# Patient Record
Sex: FEMALE | Race: WHITE | NOT HISPANIC OR LATINO
[De-identification: names, ages, dates, MRNs, and addresses within clinical notes are randomized per-mention and may not be internally consistent; named-entity substitution may affect disease eponyms.]

---

## 2017-07-24 PROBLEM — Z00.00 ENCOUNTER FOR PREVENTIVE HEALTH EXAMINATION: Status: ACTIVE | Noted: 2017-07-24

## 2021-01-12 ENCOUNTER — TRANSCRIPTION ENCOUNTER (OUTPATIENT)
Age: 41
End: 2021-01-12

## 2021-03-04 ENCOUNTER — TRANSCRIPTION ENCOUNTER (OUTPATIENT)
Age: 41
End: 2021-03-04

## 2021-10-25 ENCOUNTER — APPOINTMENT (OUTPATIENT)
Dept: RHEUMATOLOGY | Facility: CLINIC | Age: 41
End: 2021-10-25

## 2021-11-08 ENCOUNTER — APPOINTMENT (OUTPATIENT)
Dept: RHEUMATOLOGY | Facility: CLINIC | Age: 41
End: 2021-11-08

## 2022-01-11 ENCOUNTER — APPOINTMENT (OUTPATIENT)
Dept: RHEUMATOLOGY | Facility: CLINIC | Age: 42
End: 2022-01-11
Payer: MEDICAID

## 2022-01-11 VITALS
TEMPERATURE: 97.3 F | HEIGHT: 60 IN | HEART RATE: 107 BPM | DIASTOLIC BLOOD PRESSURE: 86 MMHG | BODY MASS INDEX: 33.38 KG/M2 | SYSTOLIC BLOOD PRESSURE: 150 MMHG | WEIGHT: 170 LBS | OXYGEN SATURATION: 95 %

## 2022-01-11 PROCEDURE — 99204 OFFICE O/P NEW MOD 45 MIN: CPT

## 2022-01-11 NOTE — HISTORY OF PRESENT ILLNESS
[FreeTextEntry1] : 42 y/o female w/ hypothyroidism, chronic neck/back pain on morphine and oxycodone referred to rheumatology for joint pains and swelling.\par In 6/2021, patient started to have swelling of L ankle improved in 3 days. Pt then started to have swelling of b/l ankles with grouped pustular rash of chest and back. Patient was prescribed steroid creams with improvement in the rash. Workup showed high inflammatory markers. Patient started to have swelling of hands, improved with diuretics. Pt was seen by vascular, endocrinology, cardiology. Seen Dr. Benito and prescribed HCQ 200mg BID, but due to itchiness, takes it once daily.\par Reports hair loss, fatigue. Occasional eczema patches.\par Aunt has RA, Crohn's disease.\par

## 2022-01-11 NOTE — ASSESSMENT
[FreeTextEntry1] : 40 y/o female w/ hypothyroidism, chronic neck/back pain on morphine and oxycodone referred to rheumatology for joint pains and swelling.\par In 6/2021, patient started to have swelling of L ankle improved in 3 days. Pt then started to have swelling of b/l ankles with grouped pustular rash of chest and back. Patient was prescribed steroid creams with improvement in the rash. Workup showed high inflammatory markers. Patient started to have swelling of hands, improved with diuretics. Pt was seen by vascular, endocrinology, cardiology. Seen Dr. Benito and prescribed HCQ 200mg BID, but due to itchiness, takes it once daily.\par Reports hair loss, fatigue. Occasional eczema patches.\par Aunt has RA, Crohn's disease.\par \par Patient has significant pitting edema of b/l ankles in pictures and reported edema of b/l hands currently with associated pain (although swelling component is much more significant). Based on pt's history, patient has high inflammatory markers but negative autoimmune rheum workup with normal b/l ankle XRs.\par \par Patient's description and picture of swelling is more likely from water retention rather than joint synovitis. Patient's rash of chest does not appear classic for any specific autoimmune disease. Elevated inflammatory markers can be from autoimmune inflammatory disease, infection, malignancy, etc. and is not specific.\par Patient has already had significant rheum workup with Dr. Benito and I would like to see those results prior to obtaining further bloodwork.\par \par - Pt to send me results of Dr. Benito's workup. Depending on findings, will order any further workup that will be sent to patient.\par - c/w HCQ 200mg daily for now. Patient has been on for ~2 month. If any effect, should have full effect around 3 months.\par - Obtain XR of b/l hands and wrists. XR of b/l ankles reportedly normal.\par - RTC 1-1.5 months after any further workup results.\par \par \par

## 2022-02-22 ENCOUNTER — APPOINTMENT (OUTPATIENT)
Dept: RHEUMATOLOGY | Facility: CLINIC | Age: 42
End: 2022-02-22
Payer: MEDICAID

## 2022-02-22 VITALS
WEIGHT: 160 LBS | HEART RATE: 112 BPM | DIASTOLIC BLOOD PRESSURE: 81 MMHG | SYSTOLIC BLOOD PRESSURE: 139 MMHG | OXYGEN SATURATION: 95 % | TEMPERATURE: 97.6 F | HEIGHT: 60 IN | BODY MASS INDEX: 31.41 KG/M2

## 2022-02-22 PROCEDURE — 99214 OFFICE O/P EST MOD 30 MIN: CPT

## 2022-02-22 NOTE — ASSESSMENT
[FreeTextEntry1] : 40 y/o female w/ hypothyroidism, chronic neck/back pain on morphine and oxycodone presents as follow up for joint pains and swelling.\par Of note pt had COVID-19 infection few months prior to onset of symptoms. In 6/2021, patient started to have swelling of L ankle improved in 3 days. Pt then started to have swelling of b/l ankles with grouped pustular rash of chest and back. Patient was prescribed steroid creams with improvement in the rash. Workup showed high inflammatory markers. Patient started to have swelling of hands, improved with diuretics. Pt was seen by vascular, endocrinology, cardiology. Seen Dr. Benito and prescribed HCQ 200mg BID, but due to itchiness, takes it once daily. Pt eventually stopped the medication due to pruritus.\par Reports hair loss, fatigue. Occasional eczema patches. \par Aunt has RA, Crohn's disease. \par \par Workup showed improvement in her CRP, otherwise negative for signs of underlying autoimmune rheum markers, infections, malignancy. XR b/l hands/wrists normal. It is unclear if HCQ helped since pt was only on it sporatically due to pruritis. At this time, I would like to watch pt's inflammatory markers and symptoms to see how they evolve without any treatment prior to moving on to more toxic medication such as methotrexate.\par \par - Obtain repeat inflammatory markers\par - Obtain US of b/l hand to evaluate for signs of synovitis.\par - Failed HCQ due to pruritus. Will decide on any further treatment such as MTX based on pt's symptoms, inflammatory markers, and US results.\par - RTC 2 months for follow up\par

## 2022-02-22 NOTE — HISTORY OF PRESENT ILLNESS
[FreeTextEntry1] : HISTORY: \par 42 y/o female w/ hypothyroidism, chronic neck/back pain on morphine and oxycodone presents as follow up for joint pains and swelling. \par In 6/2021, patient started to have swelling of L ankle improved in 3 days. Pt then started to have swelling of b/l ankles with grouped pustular rash of chest and back. Patient was prescribed steroid creams with improvement in the rash. Workup showed high inflammatory markers. Patient started to have swelling of hands, improved with diuretics. Pt was seen by vascular, endocrinology, cardiology. Seen Dr. Benito and prescribed HCQ 200mg BID, but due to itchiness, takes it once daily. \par Reports hair loss, fatigue. Occasional eczema patches. \par Aunt has RA, Crohn's disease. \par \par INTERVAL HISTORY: \par Patient had to stop HCQ because it caused diffuse pruritus. Pt tried to stop and restart medication multiple times but continues to have the side effects. Pt does not think that she was on the medication long enough to feel improvement. Pt reports that her pain and swelling has moved generally from b/l ankles to b/l hands.\par Denies any other symptoms\par \par WORKUP: \par Remarkable for (8/2021 - 2/2022): uric acid 8.4, CRP 60.6 -> 17.7 (nml <= 8.0) \par Normal/neg (8/2021 - 2/2022): CBC, CMP (except ALT 30), ESR, NASIR, dsDNA, Smith, RNP, SSA, SSB, TPO Ab, TG Ab, Ana-1, SCl-70, centromere Ab, cardiolipin Abs, LAC, RF, CCP, ANCAs, cryoglobulins, HLA-B27, ACE level, CPK, aldolase, SPEP, HAYDEN, Kappa/Lambda ratio, LDH, TSH, thyroid panel, Vit D 25-OH, HgbA1C, ferritin, iron panel, G6PD level, Heavy metal screening, Tick-borne diseases, Hep B/C panel, quantiferon TB \par XR b/l hands/wrists (2/2022): Normal \par

## 2022-02-22 NOTE — HISTORY OF PRESENT ILLNESS
[FreeTextEntry1] : HISTORY: \par 40 y/o female w/ hypothyroidism, chronic neck/back pain on morphine and oxycodone presents as follow up for joint pains and swelling. \par In 6/2021, patient started to have swelling of L ankle improved in 3 days. Pt then started to have swelling of b/l ankles with grouped pustular rash of chest and back. Patient was prescribed steroid creams with improvement in the rash. Workup showed high inflammatory markers. Patient started to have swelling of hands, improved with diuretics. Pt was seen by vascular, endocrinology, cardiology. Seen Dr. Benito and prescribed HCQ 200mg BID, but due to itchiness, takes it once daily. \par Reports hair loss, fatigue. Occasional eczema patches. \par Aunt has RA, Crohn's disease. \par \par INTERVAL HISTORY: \par Patient had to stop HCQ because it caused diffuse pruritus. Pt tried to stop and restart medication multiple times but continues to have the side effects. Pt does not think that she was on the medication long enough to feel improvement. Pt reports that her pain and swelling has moved generally from b/l ankles to b/l hands.\par Denies any other symptoms\par \par WORKUP: \par Remarkable for (8/2021 - 2/2022): uric acid 8.4, CRP 60.6 -> 17.7 (nml <= 8.0) \par Normal/neg (8/2021 - 2/2022): CBC, CMP (except ALT 30), ESR, NASIR, dsDNA, Smith, RNP, SSA, SSB, TPO Ab, TG Ab, Ana-1, SCl-70, centromere Ab, cardiolipin Abs, LAC, RF, CCP, ANCAs, cryoglobulins, HLA-B27, ACE level, CPK, aldolase, SPEP, HAYDEN, Kappa/Lambda ratio, LDH, TSH, thyroid panel, Vit D 25-OH, HgbA1C, ferritin, iron panel, G6PD level, Heavy metal screening, Tick-borne diseases, Hep B/C panel, quantiferon TB \par XR b/l hands/wrists (2/2022): Normal \par

## 2022-03-17 ENCOUNTER — NON-APPOINTMENT (OUTPATIENT)
Age: 42
End: 2022-03-17

## 2022-03-22 ENCOUNTER — APPOINTMENT (OUTPATIENT)
Dept: ULTRASOUND IMAGING | Facility: CLINIC | Age: 42
End: 2022-03-22
Payer: MEDICAID

## 2022-03-22 ENCOUNTER — OUTPATIENT (OUTPATIENT)
Dept: OUTPATIENT SERVICES | Facility: HOSPITAL | Age: 42
LOS: 1 days | End: 2022-03-22
Payer: MEDICAID

## 2022-03-22 DIAGNOSIS — Z90.49 ACQUIRED ABSENCE OF OTHER SPECIFIED PARTS OF DIGESTIVE TRACT: Chronic | ICD-10-CM

## 2022-03-22 DIAGNOSIS — R21 RASH AND OTHER NONSPECIFIC SKIN ERUPTION: ICD-10-CM

## 2022-03-22 PROCEDURE — 76881 US COMPL JOINT R-T W/IMG: CPT | Mod: 26,LT

## 2022-03-22 PROCEDURE — 76881 US COMPL JOINT R-T W/IMG: CPT

## 2022-04-26 ENCOUNTER — APPOINTMENT (OUTPATIENT)
Dept: RHEUMATOLOGY | Facility: CLINIC | Age: 42
End: 2022-04-26
Payer: MEDICAID

## 2022-04-26 VITALS
BODY MASS INDEX: 32.39 KG/M2 | SYSTOLIC BLOOD PRESSURE: 143 MMHG | HEIGHT: 60 IN | DIASTOLIC BLOOD PRESSURE: 83 MMHG | OXYGEN SATURATION: 96 % | TEMPERATURE: 97.8 F | WEIGHT: 165 LBS | HEART RATE: 126 BPM

## 2022-04-26 DIAGNOSIS — M48.00 SPINAL STENOSIS, SITE UNSPECIFIED: ICD-10-CM

## 2022-04-26 PROCEDURE — 99213 OFFICE O/P EST LOW 20 MIN: CPT

## 2022-04-26 NOTE — ASSESSMENT
[FreeTextEntry1] : 40 y/o female w/ hypothyroidism, chronic neck/back pain on morphine and oxycodone presents as follow up for joint pains and swelling. \par Of note pt had COVID-19 infection few months prior to onset of symptoms. In 6/2021, patient started to have swelling of L ankle improved in 3 days. Pt then started to have swelling of b/l ankles with grouped pustular rash of chest and back. Patient was prescribed steroid creams with improvement in the rash. Workup showed high inflammatory markers. Patient started to have swelling of hands, improved with diuretics. Pt was seen by vascular, endocrinology, cardiology. Seen Dr. Benito and prescribed HCQ 200mg BID, but due to itchiness, takes it once daily. Pt eventually stopped the medication due to pruritus. \par Reports hair loss, fatigue. Occasional eczema patches.  \par Aunt has RA, Crohn's disease.  \par \par Workup showed improvement in her CRP. Negative for signs of underlying autoimmune rheum markers, infections, malignancy. XR b/l hands/wrists normal. US of b/l hands showed trace b/l radiocarpal joint effusions without hyperemic synovial flow. Findings suggestive of mild OA of 2nd, 4th, 5th PIPs and 5th DIP.\par Pt had MR of C-spine and L-spine done showing spinal stenosis and nerve compression.\par \par At this time, there is no compelling evidence that patient has underlying autoimmune disease and I do believe that the degree of abnormalities on spinal imaging explains the patient's symptoms. I do not believe at this time that the benefit of starting any toxic medications like methotrexate in this patient without clear evidence of underlying autoimmune disease is greater than the risk of these medications.\par \par - Will await pt's labwork done yesterday (inflammatory markers) and will call pt with results\par - Continue follow up with neurology and pain management for treatment of pt's neuropathic pains, injections/procedures for compressive neuropathy. Discussed that if conservative therapies fail, pt can be considered for surgery with orthopedics or neurosurgery.\par - RTC 3 months or earlier PRN\par

## 2022-04-26 NOTE — HISTORY OF PRESENT ILLNESS
[FreeTextEntry1] : HISTORY: \par 42 y/o female w/ hypothyroidism, chronic neck/back pain on morphine and oxycodone presents as follow up for joint pains and swelling. \par Of note pt had COVID-19 infection few months prior to onset of symptoms. In 6/2021, patient started to have swelling of L ankle improved in 3 days. Pt then started to have swelling of b/l ankles with grouped pustular rash of chest and back. Patient was prescribed steroid creams with improvement in the rash. Workup showed high inflammatory markers. Patient started to have swelling of hands, improved with diuretics. Pt was seen by vascular, endocrinology, cardiology. Seen Dr. Benito and prescribed HCQ 200mg BID, but due to itchiness, takes it once daily. Pt eventually stopped the medication due to pruritus. \par Reports hair loss, fatigue. Occasional eczema patches.  \par Aunt has RA, Crohn's disease.  \par \par Workup showed improvement in her CRP. Negative for signs of underlying autoimmune rheum markers, infections, malignancy. XR b/l hands/wrists normal. It is unclear if HCQ helped since pt was only on it sporatically due to pruritus. At this time, I would like to watch pt's inflammatory markers and symptoms to see how they evolve without any treatment prior to moving on to more toxic medication such as methotrexate. \par \par INTERVAL HISTORY: \par Since last visit, pt was seen by pain management but states that she had a terrible experience - she was told that her opioid medication will definitely not be filled and she felt that her symptoms were dismissed. Pt is currently trying to be set up with a different pain management doctor.\par Pt had MR of C-spine and L-spine done showing spinal stenosis and nerve compression. Pt continuing to have pain and swelling of b/l UE and LE\par \par WORKUP: \par CRP 60.6 (8/2021) -> 17.7 (2/2022) -> 18.7 (3/2022) \par Remarkable for (8/2021 - 2/2022): uric acid 8.4 \par Normal/neg (8/2021 - 2/2022): CBC, CMP (except ALT 30), ESR, NASIR, dsDNA, Smith, RNP, SSA, SSB, TPO Ab, TG Ab, Ana-1, SCl-70, centromere Ab, cardiolipin Abs, LAC, RF, CCP, ANCAs, cryoglobulins, HLA-B27, ACE level, CPK, aldolase, SPEP, HAYDEN, Kappa/Lambda ratio, LDH, TSH, thyroid panel, Vit D 25-OH, HgbA1C, ferritin, iron panel, G6PD level, Heavy metal screening, Tick-borne diseases, Hep B/C panel, quantiferon TB  \par XR b/l hands/wrists (2/2022): Normal \par US b/l hands (3/2022): Trace b/l radiocarpal joint effusions without hyperemic synovial flow. Findings suggestive of mild OA of 2nd, 4th, 5th PIPs and 5th DIP.\par

## 2022-05-16 ENCOUNTER — APPOINTMENT (OUTPATIENT)
Dept: NEUROLOGY | Facility: CLINIC | Age: 42
End: 2022-05-16
Payer: MEDICAID

## 2022-05-16 ENCOUNTER — NON-APPOINTMENT (OUTPATIENT)
Age: 42
End: 2022-05-16

## 2022-05-16 VITALS
BODY MASS INDEX: 31.41 KG/M2 | WEIGHT: 160 LBS | SYSTOLIC BLOOD PRESSURE: 140 MMHG | DIASTOLIC BLOOD PRESSURE: 78 MMHG | HEIGHT: 60 IN

## 2022-05-16 PROCEDURE — 99204 OFFICE O/P NEW MOD 45 MIN: CPT

## 2022-05-16 RX ORDER — OXYCODONE AND ACETAMINOPHEN 10; 325 MG/1; MG/1
10-325 TABLET ORAL
Qty: 150 | Refills: 0 | Status: ACTIVE | COMMUNITY
Start: 2022-05-01

## 2022-05-16 RX ORDER — ERGOCALCIFEROL 1.25 MG/1
1.25 MG CAPSULE, LIQUID FILLED ORAL
Qty: 4 | Refills: 0 | Status: ACTIVE | COMMUNITY
Start: 2022-04-30

## 2022-05-16 RX ORDER — MORPHINE SULFATE 60 MG/1
60 TABLET, FILM COATED, EXTENDED RELEASE ORAL
Qty: 60 | Refills: 0 | Status: ACTIVE | COMMUNITY
Start: 2022-05-01

## 2022-05-16 RX ORDER — CLONAZEPAM 0.5 MG/1
0.5 TABLET ORAL
Qty: 60 | Refills: 0 | Status: ACTIVE | COMMUNITY
Start: 2022-04-26

## 2022-05-16 NOTE — PHYSICAL EXAM
[General Appearance - Alert] : alert [General Appearance - In No Acute Distress] : in no acute distress [General Appearance - Well-Appearing] : healthy appearing [Oriented To Time, Place, And Person] : oriented to person, place, and time [Impaired Insight] : insight and judgment were intact [Affect] : the affect was normal [Memory Recent] : recent memory was not impaired [Person] : oriented to person [Place] : oriented to place [Time] : oriented to time [Concentration Intact] : normal concentrating ability [Fluency] : fluency intact [Comprehension] : comprehension intact [Cranial Nerves Optic (II)] : visual acuity intact bilaterally,  visual fields full to confrontation, pupils equal round and reactive to light [Cranial Nerves Oculomotor (III)] : extraocular motion intact [Cranial Nerves Trigeminal (V)] : facial sensation intact symmetrically [Cranial Nerves Facial (VII)] : face symmetrical [Cranial Nerves Vestibulocochlear (VIII)] : hearing was intact bilaterally [Cranial Nerves Glossopharyngeal (IX)] : tongue and palate midline [Cranial Nerves Accessory (XI - Cranial And Spinal)] : head turning and shoulder shrug symmetric [Cranial Nerves Hypoglossal (XII)] : there was no tongue deviation with protrusion [Motor Tone] : muscle tone was normal in all four extremities [Motor Strength] : muscle strength was normal in all four extremities [No Muscle Atrophy] : normal bulk in all four extremities [Paresis Pronator Drift Right-Sided] : no pronator drift on the right [Sensation Tactile Decrease] : light touch was intact [Sensation Pain / Temperature Decrease] : pain and temperature was intact [Romberg's Sign] : Romberg's sign was negtive [Abnormal Walk] : normal gait [Balance] : balance was intact [Past-pointing] : there was no past-pointing [Tremor] : no tremor present [Coordination - Dysmetria Impaired Finger-to-Nose Bilateral] : not present [Coordination - Dysmetria Impaired Heel-to-Shin Bilateral] : not present [2+] : Patella left 2+ [1+] : Ankle jerk left 1+ [Plantar Reflex Right Only] : normal on the right [Plantar Reflex Left Only] : normal on the left [PERRL With Normal Accommodation] : pupils were equal in size, round, reactive to light, with normal accommodation [Extraocular Movements] : extraocular movements were intact [Full Visual Field] : full visual field

## 2022-05-16 NOTE — ASSESSMENT
[FreeTextEntry1] : Trigeminal neuralgia by history\par \par Excellent Pain control on Trileptal 600 mg at bedtime.\par \par Brain lipoma as per patient's history\par \par Follow-up brain MRI will be arranged.  \par \par Routine follow-up here in 6 months and by phone prior to that as needed.

## 2022-05-16 NOTE — HISTORY OF PRESENT ILLNESS
[FreeTextEntry1] : She has been following with a neurologist, Dr. Morrell who has moved out of [facility].  Diagnosed with trigeminal neuralgia in 2004.  Her description encompasses a right V 3 distribution.  She was getting shooting electric like pains.  She is on Trileptal 600 m g at bedtime with excellent control of her pain.  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  She says her last MRI was 3 to 4 years ago.\par \par She is also under care of pain management for neck and back pains.  Currently on morphine and oxycodone for that.  She uses clonazepam as needed for sleep.\par \par She is followed by rheumatology for joint pains and swelling of unclear etiology

## 2022-06-12 ENCOUNTER — NON-APPOINTMENT (OUTPATIENT)
Age: 42
End: 2022-06-12

## 2022-07-10 ENCOUNTER — NON-APPOINTMENT (OUTPATIENT)
Age: 42
End: 2022-07-10

## 2022-07-19 ENCOUNTER — APPOINTMENT (OUTPATIENT)
Dept: RHEUMATOLOGY | Facility: CLINIC | Age: 42
End: 2022-07-19

## 2022-07-19 VITALS
DIASTOLIC BLOOD PRESSURE: 74 MMHG | RESPIRATION RATE: 18 BRPM | WEIGHT: 170 LBS | HEIGHT: 60 IN | OXYGEN SATURATION: 97 % | BODY MASS INDEX: 33.38 KG/M2 | SYSTOLIC BLOOD PRESSURE: 135 MMHG | HEART RATE: 95 BPM

## 2022-07-19 DIAGNOSIS — R21 RASH AND OTHER NONSPECIFIC SKIN ERUPTION: ICD-10-CM

## 2022-07-19 PROCEDURE — 99214 OFFICE O/P EST MOD 30 MIN: CPT

## 2022-07-19 NOTE — ASSESSMENT
[FreeTextEntry1] : 42 y/o female w/ hypothyroidism, chronic neck/back pain on morphine and oxycodone presents as follow up for joint pains and swelling. \par Of note pt had COVID-19 infection few months prior to onset of symptoms. In 6/2021, patient started to have swelling of L ankle improved in 3 days. Pt then started to have swelling of b/l ankles with grouped pustular rash of chest and back. Patient was prescribed steroid creams with improvement in the rash. Workup showed high inflammatory markers. Patient started to have swelling of hands, improved with diuretics. Pt was seen by vascular, endocrinology, cardiology. Seen Dr. Benito and prescribed HCQ 200mg BID, but due to itchiness, takes it once daily. Pt eventually stopped the medication due to pruritus. \par Reports hair loss, fatigue. Occasional eczema patches. \par Aunt has RA, Crohn's disease. \par \par Workup shows contiuning improvement in her CRP. Negative for signs of underlying autoimmune rheum markers, infections, malignancy. XR b/l hands/wrists normal. US of b/l hands showed trace b/l radiocarpal joint effusions without hyperemic synovial flow. Findings suggestive of mild OA of 2nd, 4th, 5th PIPs and 5th DIP. \par Pt had MR of C-spine and L-spine done showing spinal stenosis and nerve compression. \par MR T-spine showed incidental esophageal signal and pt underwent EGD, colonoscopy with biopsy of the abnormal tissue, pending results.\par Pt has been following with ENT for feeling of ear pressure, told that she has pressure in the ears likely caused by inflammation in the ears.\par \par At this time, there is no compelling evidence that patient has underlying autoimmune disease and I do believe that the degree of abnormalities on spinal imaging explains the patient's symptoms. I do not believe at this time that the benefit of starting any toxic medications like methotrexate in this patient without clear evidence of underlying autoimmune disease is greater than the risk of these medications.\par \par Pt continues to be understandably frustrated by lack of diagnosis. I explained that my workup is specifically to evaluate for signs of autoimmune rheum diseases and there is so evidence of them so far.\par \par - Obtain repeat inflammatory markers \par - Obtain MR of L ankle to distinguish between water retention and synovitis.\par - Continue follow up with neurology and pain management for treatment of pt's neuropathic pains, injections/procedures for compressive neuropathy. Discussed that if conservative therapies fail, pt can be considered for surgery with orthopedics or neurosurgery. \par - Will call patient with results if all received. RTC 1 month for follow up \par

## 2022-07-19 NOTE — ASSESSMENT
[FreeTextEntry1] : 40 y/o female w/ hypothyroidism, chronic neck/back pain on morphine and oxycodone presents as follow up for joint pains and swelling. \par Of note pt had COVID-19 infection few months prior to onset of symptoms. In 6/2021, patient started to have swelling of L ankle improved in 3 days. Pt then started to have swelling of b/l ankles with grouped pustular rash of chest and back. Patient was prescribed steroid creams with improvement in the rash. Workup showed high inflammatory markers. Patient started to have swelling of hands, improved with diuretics. Pt was seen by vascular, endocrinology, cardiology. Seen Dr. Benito and prescribed HCQ 200mg BID, but due to itchiness, takes it once daily. Pt eventually stopped the medication due to pruritus. \par Reports hair loss, fatigue. Occasional eczema patches. \par Aunt has RA, Crohn's disease. \par \par Workup shows contiuning improvement in her CRP. Negative for signs of underlying autoimmune rheum markers, infections, malignancy. XR b/l hands/wrists normal. US of b/l hands showed trace b/l radiocarpal joint effusions without hyperemic synovial flow. Findings suggestive of mild OA of 2nd, 4th, 5th PIPs and 5th DIP. \par Pt had MR of C-spine and L-spine done showing spinal stenosis and nerve compression. \par MR T-spine showed incidental esophageal signal and pt underwent EGD, colonoscopy with biopsy of the abnormal tissue, pending results.\par Pt has been following with ENT for feeling of ear pressure, told that she has pressure in the ears likely caused by inflammation in the ears.\par \par At this time, there is no compelling evidence that patient has underlying autoimmune disease and I do believe that the degree of abnormalities on spinal imaging explains the patient's symptoms. I do not believe at this time that the benefit of starting any toxic medications like methotrexate in this patient without clear evidence of underlying autoimmune disease is greater than the risk of these medications.\par \par Pt continues to be understandably frustrated by lack of diagnosis. I explained that my workup is specifically to evaluate for signs of autoimmune rheum diseases and there is so evidence of them so far.\par \par - Obtain repeat inflammatory markers \par - Obtain MR of L ankle to distinguish between water retention and synovitis.\par - Continue follow up with neurology and pain management for treatment of pt's neuropathic pains, injections/procedures for compressive neuropathy. Discussed that if conservative therapies fail, pt can be considered for surgery with orthopedics or neurosurgery. \par - Will call patient with results if all received. RTC 1 month for follow up \par

## 2022-07-19 NOTE — HISTORY OF PRESENT ILLNESS
[FreeTextEntry1] : HISTORY: \par 40 y/o female w/ hypothyroidism, chronic neck/back pain on morphine and oxycodone presents as follow up for joint pains and swelling. \par Of note pt had COVID-19 infection few months prior to onset of symptoms. In 6/2021, patient started to have swelling of L ankle improved in 3 days. Pt then started to have swelling of b/l ankles with grouped pustular rash of chest and back. Patient was prescribed steroid creams with improvement in the rash. Workup showed high inflammatory markers. Patient started to have swelling of hands, improved with diuretics. Pt was seen by vascular, endocrinology, cardiology. Seen Dr. Benito and prescribed HCQ 200mg BID, but due to itchiness, takes it once daily. Pt eventually stopped the medication due to pruritus. \par Reports hair loss, fatigue. Occasional eczema patches. \par Aunt has RA, Crohn's disease. \par \par Workup shows contiuning improvement in her CRP. Negative for signs of underlying autoimmune rheum markers, infections, malignancy. XR b/l hands/wrists normal. US of b/l hands showed trace b/l radiocarpal joint effusions without hyperemic synovial flow. Findings suggestive of mild OA of 2nd, 4th, 5th PIPs and 5th DIP. \par Pt had MR of C-spine and L-spine done showing spinal stenosis and nerve compression. \par \par INTERVAL HISTORY: \par MR T-spine showed incidental esophageal signal and pt underwent EGD, colonoscopy with biopsy of the abnormal tissue, pending results.\par Pt has been following with ENT for feeling of ear pressure, told that she has pressure in the ears likely caused by inflammation in the ears. Pt reports continuing recurrent swelling of hands and feet which improves with water pills. Pt continues to have joint pains in b/l hands, wrists, and feet that is not associated with time of day or activity.\par \par WORKUP: \par CRP 60.6 (8/2021) -> 17.7 (2/2022) -> 18.7 (3/2022) -> 13.4 (4/2022) \par Remarkable for (8/2021 - 2/2022): uric acid 8.4 \par Normal/neg (8/2021 - 2/2022): CBC, CMP (except ALT 30), ESR, NASIR, dsDNA, Smith, RNP, SSA, SSB, TPO Ab, TG Ab, Ana-1, SCl-70, centromere Ab, cardiolipin Abs, LAC, RF, CCP, ANCAs, cryoglobulins, HLA-B27, ACE level, CPK, aldolase, SPEP, HAYDEN, Kappa/Lambda ratio, LDH, TSH, thyroid panel, Vit D 25-OH, HgbA1C, ferritin, iron panel, G6PD level, Heavy metal screening, Tick-borne diseases, Hep B/C panel, quantiferon TB \par XR b/l hands/wrists (2/2022): Normal \par US b/l hands (3/2022): Trace b/l radiocarpal joint effusions without hyperemic synovial flow. Findings suggestive of mild OA of 2nd, 4th, 5th PIPs and 5th DIP.\par \par

## 2022-08-03 ENCOUNTER — NON-APPOINTMENT (OUTPATIENT)
Age: 42
End: 2022-08-03

## 2022-08-23 ENCOUNTER — APPOINTMENT (OUTPATIENT)
Dept: RHEUMATOLOGY | Facility: CLINIC | Age: 42
End: 2022-08-23

## 2022-08-23 VITALS
TEMPERATURE: 98.2 F | WEIGHT: 170 LBS | OXYGEN SATURATION: 98 % | HEIGHT: 60 IN | BODY MASS INDEX: 33.38 KG/M2 | HEART RATE: 100 BPM | SYSTOLIC BLOOD PRESSURE: 142 MMHG | DIASTOLIC BLOOD PRESSURE: 84 MMHG

## 2022-08-23 DIAGNOSIS — M25.471 EFFUSION, RIGHT ANKLE: ICD-10-CM

## 2022-08-23 DIAGNOSIS — M25.50 PAIN IN UNSPECIFIED JOINT: ICD-10-CM

## 2022-08-23 DIAGNOSIS — M79.89 OTHER SPECIFIED SOFT TISSUE DISORDERS: ICD-10-CM

## 2022-08-23 DIAGNOSIS — M25.472 EFFUSION, RIGHT ANKLE: ICD-10-CM

## 2022-08-23 PROCEDURE — 99213 OFFICE O/P EST LOW 20 MIN: CPT

## 2022-08-23 NOTE — ASSESSMENT
[FreeTextEntry1] : 42 y/o female w/ hypothyroidism, chronic neck/back pain on morphine and oxycodone presents as follow up for joint pains and swelling.  \par Of note pt had COVID-19 infection few months prior to onset of symptoms. In 6/2021, patient started to have swelling of L ankle improved in 3 days. Pt then started to have swelling of b/l ankles with grouped pustular rash of chest and back. Patient was prescribed steroid creams with improvement in the rash. Workup showed high inflammatory markers. Patient started to have swelling of hands, improved with diuretics. Pt was seen by vascular, endocrinology, cardiology. Seen Dr. Benito and prescribed HCQ 200mg BID, but due to itchiness, takes it once daily. Pt eventually stopped the medication due to pruritus.  \par Reports hair loss, fatigue. Occasional eczema patches.  \par Aunt has RA, Crohn's disease.  \par \par Workup shows continuing improvement in her CRP. Negative for signs of underlying autoimmune rheum markers, infections, malignancy. XR b/l hands/wrists normal. US of b/l hands showed trace b/l radiocarpal joint effusions without hyperemic synovial flow. Findings suggestive of mild OA of 2nd, 4th, 5th PIPs and 5th DIP.  \par Pt had MR of C-spine and L-spine done showing spinal stenosis and nerve compression.  \par MR T-spine showed incidental esophageal signal and pt underwent EGD, colonoscopy with biopsy of the abnormal tissue, pending results. \par Pt has been following with ENT for feeling of ear pressure, told that she has pressure in the ears likely caused by inflammation in the ears. \par \par At this time, there is no compelling evidence that patient has underlying autoimmune disease and I do believe that the degree of abnormalities on spinal imaging explains the patient's symptoms. I do not believe at this time that the benefit of starting any toxic medications like methotrexate in this patient without clear evidence of underlying autoimmune disease is greater than the risk of these medications.\par Pt has had workup for b/l LE edema by cardiology which was negative.\par \par Pt continues to be understandably frustrated by lack of diagnosis. I explained that my workup is specifically to evaluate for signs of autoimmune rheum diseases and there is so evidence of them so far. \par \par - Obtain 24 hour urine protein to evaluate for possible proteinuria causing water retention.\par - Pending results of MR of L ankle to distinguish between water retention and synovitis. \par - Pending 13.4.4 eta protein result from [facility]\par - Continue follow up with neurology and pain management for treatment of pt's neuropathic pains, injections/procedures for compressive neuropathy. Discussed that if conservative therapies fail, pt can be considered for surgery with orthopedics or neurosurgery.  \par - Will call pt with results. If MR shows synovitis, will discuss steroid and DMARDs. If proteinuria, will refer to nephro. If neither, will consider discussing malignancy screening (including general malignancy screening, pelvic ultrasound, potentially CT C/A/P).\par

## 2022-08-23 NOTE — HISTORY OF PRESENT ILLNESS
[FreeTextEntry1] : HISTORY: \par 42 y/o female w/ hypothyroidism, chronic neck/back pain on morphine and oxycodone presents as follow up for joint pains and swelling.  \par Of note pt had COVID-19 infection few months prior to onset of symptoms. In 6/2021, patient started to have swelling of L ankle improved in 3 days. Pt then started to have swelling of b/l ankles with grouped pustular rash of chest and back. Patient was prescribed steroid creams with improvement in the rash. Workup showed high inflammatory markers. Patient started to have swelling of hands, improved with diuretics. Pt was seen by vascular, endocrinology, cardiology. Seen Dr. Benito and prescribed HCQ 200mg BID, but due to itchiness, takes it once daily. Pt eventually stopped the medication due to pruritus.  \par Reports hair loss, fatigue. Occasional eczema patches.  \par Aunt has RA, Crohn's disease.  \par \par Workup shows continuing improvement in her CRP. Negative for signs of underlying autoimmune rheum markers, infections, malignancy. XR b/l hands/wrists normal. US of b/l hands showed trace b/l radiocarpal joint effusions without hyperemic synovial flow. Findings suggestive of mild OA of 2nd, 4th, 5th PIPs and 5th DIP.  \par Pt had MR of C-spine and L-spine done showing spinal stenosis and nerve compression.  \par MR T-spine showed incidental esophageal signal and pt underwent EGD, colonoscopy with biopsy of the abnormal tissue, pending results. \par Pt has been following with ENT for feeling of ear pressure, told that she has pressure in the ears likely caused by inflammation in the ears. \par \par At this time, there is no compelling evidence that patient has underlying autoimmune disease and I do believe that the degree of abnormalities on spinal imaging explains the patient's symptoms. I do not believe at this time that the benefit of starting any toxic medications like methotrexate in this patient without clear evidence of underlying autoimmune disease is greater than the risk of these medications. \par \par Pt continues to be understandably frustrated by lack of diagnosis. I explained that my workup is specifically to evaluate for signs of autoimmune rheum diseases and there is so evidence of them so far. \par \par INTERVAL HISTORY: \par Pt had repeat labwork done showing CRP persistently elevated but normal ESR. Pt has high WBC count, which was not previously seen. Pt denies any symptoms of infection including fever, dysuria, SOB, cough, and denies any use of steroids. Pt has not had MR ankle done yet.\par Pt continues to have similar symptoms of recurrent b/l ankle swelling, b/l hand stiffness unchanged.\par \par WORKUP:  \par CRP 60.6 (8/2021) -> 17.7 (2/2022) -> 18.7 (3/2022) -> 13.4 (4/2022) -> 21.9 (8/2022)\par Remarkable for (8/2021 - 2/2022): uric acid 8.4  \par Normal/neg (8/2021 - 2/2022): CBC, CMP (except ALT 30), ESR, NASIR, dsDNA, Smith, RNP, SSA, SSB, TPO Ab, TG Ab, Ana-1, SCl-70, centromere Ab, cardiolipin Abs, LAC, RF, CCP, ANCAs, cryoglobulins, HLA-B27, ACE level, CPK, aldolase, SPEP, HAYDEN, Kappa/Lambda ratio, LDH, TSH, thyroid panel, Vit D 25-OH, HgbA1C, ferritin, iron panel, G6PD level, Heavy metal screening, Tick-borne diseases, Hep B/C panel, quantiferon TB  \par XR b/l hands/wrists (2/2022): Normal  \par MR T-spine (6/2022): No spinal canal or foraminal stenosis. Minimal leftward curvature. Mild multilevel end plate spondylosis. Indeterminate abnormal signal of anterior aspect of distal thoracic esophagus – recommend GI consult, esophagram, postcontrast chest CT. \par US b/l hands (3/2022): Trace b/l radiocarpal joint effusions without hyperemic synovial flow. Findings suggestive of mild OA of 2nd, 4th, 5th PIPs and 5th DIP.\par

## 2022-09-08 ENCOUNTER — NON-APPOINTMENT (OUTPATIENT)
Age: 42
End: 2022-09-08

## 2022-09-22 ENCOUNTER — NON-APPOINTMENT (OUTPATIENT)
Age: 42
End: 2022-09-22

## 2022-11-03 ENCOUNTER — NON-APPOINTMENT (OUTPATIENT)
Age: 42
End: 2022-11-03

## 2022-11-21 ENCOUNTER — APPOINTMENT (OUTPATIENT)
Dept: NEUROLOGY | Facility: CLINIC | Age: 42
End: 2022-11-21

## 2022-11-21 VITALS
HEIGHT: 60 IN | SYSTOLIC BLOOD PRESSURE: 160 MMHG | BODY MASS INDEX: 34.36 KG/M2 | DIASTOLIC BLOOD PRESSURE: 90 MMHG | WEIGHT: 175 LBS

## 2022-11-21 PROCEDURE — 99214 OFFICE O/P EST MOD 30 MIN: CPT

## 2022-11-21 RX ORDER — OXCARBAZEPINE 300 MG/1
300 TABLET, FILM COATED ORAL
Qty: 60 | Refills: 0 | Status: COMPLETED | COMMUNITY
Start: 2022-04-25 | End: 2022-11-21

## 2022-11-21 NOTE — HISTORY OF PRESENT ILLNESS
[FreeTextEntry1] : I saw her initially 5/16/2022 with the following history.  She has been following with a neurologist, Dr. Morrell who has moved out of state.  Diagnosed with trigeminal neuralgia in 2004.  Her description encompasses a right V 3 distribution.  She was getting shooting electric like pains.  She is on Trileptal 600 m g at bedtime with excellent control of her pain.  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  She says her last MRI was 3 to 4 years ago.\par \par She is also under care of pain management for neck and back pains.  Currently on morphine and oxycodone for that.  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

## 2022-11-21 NOTE — ASSESSMENT
[FreeTextEntry1] : Trigeminal neuralgia by history\par \par Excellent Pain control on Trileptal 600 mg at bedtime.\par \par Brain lipoma as per patient's history\par Stable on serial MRI \par \par Routine follow-up here in 6 months and by phone prior to that as needed.

## 2022-12-22 ENCOUNTER — NON-APPOINTMENT (OUTPATIENT)
Age: 42
End: 2022-12-22

## 2023-01-26 ENCOUNTER — NON-APPOINTMENT (OUTPATIENT)
Age: 43
End: 2023-01-26

## 2023-05-22 ENCOUNTER — APPOINTMENT (OUTPATIENT)
Dept: NEUROLOGY | Facility: CLINIC | Age: 43
End: 2023-05-22
Payer: MEDICAID

## 2023-05-22 VITALS
DIASTOLIC BLOOD PRESSURE: 80 MMHG | WEIGHT: 175 LBS | SYSTOLIC BLOOD PRESSURE: 160 MMHG | HEIGHT: 62 IN | BODY MASS INDEX: 32.2 KG/M2

## 2023-05-22 PROCEDURE — 99214 OFFICE O/P EST MOD 30 MIN: CPT

## 2023-05-22 NOTE — ASSESSMENT
[FreeTextEntry1] : Trigeminal neuralgia by history\par \par Excellent Pain control on Trileptal 600 mg at bedtime.\par \par Brain lipoma as per patient's history\par Stable on serial MRI \par \par Routine follow-up here in 1 year and by phone prior to that as needed.

## 2024-03-04 ENCOUNTER — APPOINTMENT (OUTPATIENT)
Dept: NEUROLOGY | Facility: CLINIC | Age: 44
End: 2024-03-04
Payer: MEDICAID

## 2024-03-04 VITALS
DIASTOLIC BLOOD PRESSURE: 70 MMHG | HEIGHT: 62 IN | SYSTOLIC BLOOD PRESSURE: 140 MMHG | BODY MASS INDEX: 32.2 KG/M2 | WEIGHT: 175 LBS

## 2024-03-04 DIAGNOSIS — G50.0 TRIGEMINAL NEURALGIA: ICD-10-CM

## 2024-03-04 PROCEDURE — 99214 OFFICE O/P EST MOD 30 MIN: CPT

## 2024-03-04 PROCEDURE — G2211 COMPLEX E/M VISIT ADD ON: CPT | Mod: NC,1L

## 2024-03-04 RX ORDER — OXCARBAZEPINE 600 MG/1
600 TABLET, FILM COATED ORAL
Qty: 90 | Refills: 3 | Status: ACTIVE | COMMUNITY
Start: 2022-05-16 | End: 1900-01-01

## 2024-03-04 NOTE — PHYSICAL EXAM
[General Appearance - Alert] : alert [General Appearance - Well-Appearing] : healthy appearing [General Appearance - In No Acute Distress] : in no acute distress [Oriented To Time, Place, And Person] : oriented to person, place, and time [Impaired Insight] : insight and judgment were intact [Affect] : the affect was normal [Memory Recent] : recent memory was not impaired [Person] : oriented to person [Place] : oriented to place [Time] : oriented to time [Concentration Intact] : normal concentrating ability [Fluency] : fluency intact [Comprehension] : comprehension intact [Cranial Nerves Optic (II)] : visual acuity intact bilaterally,  visual fields full to confrontation, pupils equal round and reactive to light [Cranial Nerves Trigeminal (V)] : facial sensation intact symmetrically [Cranial Nerves Oculomotor (III)] : extraocular motion intact [Cranial Nerves Facial (VII)] : face symmetrical [Cranial Nerves Glossopharyngeal (IX)] : tongue and palate midline [Cranial Nerves Vestibulocochlear (VIII)] : hearing was intact bilaterally [Cranial Nerves Accessory (XI - Cranial And Spinal)] : head turning and shoulder shrug symmetric [Cranial Nerves Hypoglossal (XII)] : there was no tongue deviation with protrusion [Motor Tone] : muscle tone was normal in all four extremities [Motor Strength] : muscle strength was normal in all four extremities [No Muscle Atrophy] : normal bulk in all four extremities [Paresis Pronator Drift Right-Sided] : no pronator drift on the right [Sensation Tactile Decrease] : light touch was intact [Sensation Pain / Temperature Decrease] : pain and temperature was intact [Romberg's Sign] : Romberg's sign was negtive [Abnormal Walk] : normal gait [Balance] : balance was intact [Past-pointing] : there was no past-pointing [Tremor] : no tremor present [Coordination - Dysmetria Impaired Finger-to-Nose Bilateral] : not present [Coordination - Dysmetria Impaired Heel-to-Shin Bilateral] : not present [2+] : Patella right 2+ [1+] : Ankle jerk left 1+ [Plantar Reflex Right Only] : normal on the right [Plantar Reflex Left Only] : normal on the left [PERRL With Normal Accommodation] : pupils were equal in size, round, reactive to light, with normal accommodation [Extraocular Movements] : extraocular movements were intact [Full Visual Field] : full visual field

## 2024-03-04 NOTE — HISTORY OF PRESENT ILLNESS
[FreeTextEntry1] : I saw her initially 5/16/2022 with the following history.  She has been following with a neurologist, Dr. Morrell who has moved out of state.  Diagnosed with trigeminal neuralgia in 2004.  Her description encompasses a right V 3 distribution.  She was getting shooting electric like pains.  She is on Trileptal 600 m g at bedtime with excellent control of her pain.  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  She says her last MRI was 3 to 4 years ago.  She is also under care of pain management for neck and back pains.  Currently on morphine and oxycodone for that.  She uses clonazepam as needed for sleep.  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

## 2024-03-04 NOTE — ASSESSMENT
[FreeTextEntry1] : Trigeminal neuralgia by history  Excellent Pain control on Trileptal 600 mg at bedtime.  Brain lipoma as per patient's history Stable on serial MRI   Routine follow-up here in 1 year and by phone prior to that as needed.  I have given her a clearance note for planned dental surgery I have explained to her that there is no guarantee that the dental work will not cause an exacerbation of her trigeminal neuralgia and she understands this.

## 2025-03-03 ENCOUNTER — APPOINTMENT (OUTPATIENT)
Dept: NEUROLOGY | Facility: CLINIC | Age: 45
End: 2025-03-03
Payer: MEDICAID

## 2025-03-03 ENCOUNTER — NON-APPOINTMENT (OUTPATIENT)
Age: 45
End: 2025-03-03

## 2025-03-03 DIAGNOSIS — M25.472 EFFUSION, RIGHT ANKLE: ICD-10-CM

## 2025-03-03 DIAGNOSIS — M25.471 EFFUSION, RIGHT ANKLE: ICD-10-CM

## 2025-03-03 DIAGNOSIS — G50.0 TRIGEMINAL NEURALGIA: ICD-10-CM

## 2025-03-03 PROCEDURE — G2211 COMPLEX E/M VISIT ADD ON: CPT | Mod: NC

## 2025-03-03 PROCEDURE — 99214 OFFICE O/P EST MOD 30 MIN: CPT

## 2025-03-03 RX ORDER — METHYLPREDNISOLONE 4 MG/1
4 TABLET ORAL
Qty: 1 | Refills: 0 | Status: ACTIVE | COMMUNITY
Start: 2025-03-03 | End: 1900-01-01